# Patient Record
Sex: MALE | Race: OTHER | HISPANIC OR LATINO | Employment: OTHER | URBAN - METROPOLITAN AREA
[De-identification: names, ages, dates, MRNs, and addresses within clinical notes are randomized per-mention and may not be internally consistent; named-entity substitution may affect disease eponyms.]

---

## 2018-09-11 ENCOUNTER — APPOINTMENT (OUTPATIENT)
Dept: RADIOLOGY | Facility: MEDICAL CENTER | Age: 83
End: 2018-09-11
Attending: EMERGENCY MEDICINE
Payer: MEDICARE

## 2018-09-11 ENCOUNTER — HOSPITAL ENCOUNTER (EMERGENCY)
Facility: MEDICAL CENTER | Age: 83
End: 2018-09-11
Attending: EMERGENCY MEDICINE
Payer: MEDICARE

## 2018-09-11 VITALS
HEIGHT: 60 IN | WEIGHT: 165.12 LBS | DIASTOLIC BLOOD PRESSURE: 88 MMHG | OXYGEN SATURATION: 96 % | RESPIRATION RATE: 14 BRPM | SYSTOLIC BLOOD PRESSURE: 152 MMHG | HEART RATE: 65 BPM | BODY MASS INDEX: 32.42 KG/M2 | TEMPERATURE: 98.7 F

## 2018-09-11 DIAGNOSIS — M54.31 RIGHT SIDED SCIATICA: ICD-10-CM

## 2018-09-11 PROCEDURE — 73502 X-RAY EXAM HIP UNI 2-3 VIEWS: CPT | Mod: RT

## 2018-09-11 PROCEDURE — 700102 HCHG RX REV CODE 250 W/ 637 OVERRIDE(OP): Performed by: EMERGENCY MEDICINE

## 2018-09-11 PROCEDURE — 99284 EMERGENCY DEPT VISIT MOD MDM: CPT

## 2018-09-11 PROCEDURE — A9270 NON-COVERED ITEM OR SERVICE: HCPCS | Performed by: EMERGENCY MEDICINE

## 2018-09-11 RX ORDER — OXYCODONE HYDROCHLORIDE AND ACETAMINOPHEN 5; 325 MG/1; MG/1
1-2 TABLET ORAL EVERY 4 HOURS PRN
Qty: 12 TAB | Refills: 0 | Status: SHIPPED | OUTPATIENT
Start: 2018-09-11 | End: 2018-09-14

## 2018-09-11 RX ORDER — OXYCODONE HYDROCHLORIDE AND ACETAMINOPHEN 5; 325 MG/1; MG/1
1 TABLET ORAL ONCE
Status: COMPLETED | OUTPATIENT
Start: 2018-09-11 | End: 2018-09-11

## 2018-09-11 RX ADMIN — OXYCODONE HYDROCHLORIDE AND ACETAMINOPHEN 1 TABLET: 5; 325 TABLET ORAL at 19:52

## 2018-09-11 ASSESSMENT — PAIN SCALES - GENERAL
PAINLEVEL_OUTOF10: 10
PAINLEVEL_OUTOF10: 10

## 2018-09-12 NOTE — DISCHARGE INSTRUCTIONS
Ciática  (Sciatica)  La ciática es el dolor, entumecimiento, debilidad u hormigueo a lo carol del nervio ciático. El nervio ciático comienza en la parte inferior de la espalda y desciende por la parte posterior de cada pierna. Controla los músculos en la parte inferior de las piernas y en la parte posterior de las rodillas. También otorga sensibilidad a la parte posterior de los muslos, la parte inferior de las piernas y la planta de los pies. La ciática es un síntoma de otra afección que ejerce presión o “pellizca” el nervio ciático.  Generalmente la ciática afecta sólo un lado del cuerpo. Suele desaparecer por sí tenisha o con tratamiento. En algunos casos, la ciática puede volver a aparecer .  CAUSAS  Esta afección causa presión sobre el nervio ciático o lo “pellizca”. [place] puede ser el resultado de:  · Un disco que sobresale demasiado (hernia de disco) entre los huesos de la columna vertebral (vértebras).  · Cambios relacionados con la edad en los discos de la columna vertebral (discopatía degenerativa).  · Un trastorno doloroso que afecta un músculo de los glúteos (síndrome piriforme).  · Un crecimiento óseo adicional (espolón óseo) cerca del nervio ciático.  · Leslie lesión o fractura de la pelvis.  · Embarazo.  · Tumor (poco frecuente).  FACTORES DE RIESGO  Los siguientes factores pueden hacer que usted sea propenso a sufrir esta afección:  · Practicar deportes en los que se ejerce presión sobre la columna vertebral o en los que la columna realiza mucho esfuerzo, liliam el fútbol americano o el levantamiento de pesas.  · Tener poca fuerza y flexibilidad.  · Antecedentes médicos de lesiones en la espalda.  · Antecedentes médicos de cirugía en la espalda.  · Estar sentado diann largos períodos.  · Realizar actividades que requieren agacharse o levantar objetos en forma repetida.  · Obesidad.  SÍNTOMAS  Los síntomas pueden ser leves o graves, y pueden incluir los siguientes:  · Cualquiera de los siguientes problemas  en la parte inferior de la espalda, piernas, cadera o glúteos:  ¨ Hormigueo leve o dolor sordo.  ¨ Sensación de ardor.  ¨ Dolor aric.  · Adormecimiento de la parte posterior de la pantorrilla o la planta del pie.  · Debilidad en las piernas.  · Dolor de espalda intenso que dificulta el movimiento.  Estos síntomas podrían empeorar al toser, estornudar o reírse, o cuando se está sentado o de pie diann períodos prolongados. El sobrepeso también puede empeorar los síntomas. En algunos casos, los síntomas regresan luego de un tiempo.  DIAGNÓSTICO  Esta afección se puede diagnosticar en función de lo siguiente:  · Jayda síntomas.  · Un examen físico. El médico podría indicarle que realice ciertos movimientos para controlar si estos desencadenan los síntomas.  · También pueden hacerle exámenes que incluyen lo siguiente:  ¨ Análisis de yakov.  ¨ Radiografías.  ¨ Resonancia magnética (RM).  ¨ Tomografía computarizada (TC).  TRATAMIENTO  En muchos casos, esta afección mejora por sí tenisha, sin ningún tratamiento. Sin embargo, el tratamiento puede incluir lo siguiente:  · Reducción o modificación de la actividad física en los períodos de dolor.  · Ejercicios y estiramiento para fortalecer el abdomen y mejorar la flexibilidad de la columna vertebral.  · Aplicación de calor o hielo en la dom afectada.  · Medicamentos para lo siguiente:  ¨ Aliviar el dolor y la inflamación.  ¨ Relajar los músculos.  · Medicamentos inyectables que ayudan a aliviar el dolor, la irritación y la inflamación alrededor del nervio ciático (esteroides).  · Cirugía.  INSTRUCCIONES PARA EL CUIDADO EN EL HOGAR  Medicamentos   · [place]antis los medicamentos de venta manish y los recetados solamente liliam se lo haya indicado el médico.  · No conduzca ni opere maquinaria pesada mientras lakeshia analgésicos recetados.  Control del dolor   · Si se lo indican, aplique hielo en la dom afectada.  ¨ Ponga el hielo en jeanmarie bolsa plástica.  ¨ Coloque jeanmarie toalla entre la piel y  la bolsa de hielo.  ¨ Coloque el hielo diann 20 minutos, 2 a 3 veces por día.  · Después del hielo, aplique calor sobre la dom afectada antes de realizar ejercicio o con la frecuencia que le haya indicado el médico. Use la milagros de calor que el médico le recomiende, liliam jeanmarie compresa de calor húmedo o jeanmarie almohadilla térmica.  ¨ Coloque jeanmarie toalla entre la piel y la milagros de calor.  ¨ Aplique el calor diann 20 a 30 minutos.  ¨ Retire la milagros de calor si la piel se le pone de color melo brillante. [place] es muy importante si no puede sentir el dolor, el calor o el frío. Puede correr un riesgo mayor de sufrir quemaduras.  Actividad   · Reanude vee actividades normales liliam se lo haya indicado el médico. Pregúntele al médico qué actividades son seguras para usted.  ¨ Evite las actividades que empeoran los síntomas.  · Diann el día, descanse diann lapsos breves. Descansar recostado o de pie suele ser mejor que hacerlo sentado.  ¨ Cuando descanse diann períodos más largos, incorpore alguna actividad suave o ejercicios de elongación entre períodos. [place] ayudará a evitar la rigidez y el dolor.  ¨ Evite estar sentado diann largos períodos sin moverse. Levántese y muévase al menos jeanmarie vez cada hora.  · Zuleika ejercicio y elongue habitualmente, liliam se lo haya indicado el médico.  · No levante nada que pese más de 10 libras (4,5 kg) mientras tenga síntomas de ciática. Aunque no tenga síntomas, evite levantar objetos pesados, en especial en forma repetida.  · Siempre use las técnicas de levantamiento correctas para levantar objetos, entre ellas:  ¨ Flexionar las rodillas.  ¨ Mantener la carga cerca del cuerpo.  ¨ No torcerse.  Instrucciones generales   · Mantenga jeanmarie buena postura.  ¨ Evite reclinarse hacia adelante cuando está sentado.  ¨ Evite encorvar la espalda mientras está de pie.  · Mantenga un peso saludable. El exceso de peso ejerce presión adicional sobre la espalda y hace que resulte difícil mantener  jeanmarie buena postura.  · Use calzado con buen apoyo y cómodo. Evite usar tacones.  · Evite dormir sobre un colchón que sea demasiado blando o demasiado sheldon. Un colchón que ofrezca un apoyo suficientemente firme para mariscal espalda al dormir puede ayudar a aliviar el dolor.  · Concurra a todas las visitas de control liliam se lo haya indicado el médico. [place] es importante.  SOLICITE ATENCIÓN MÉDICA SI:  · El dolor lo despierta cuando está dormido.  · El dolor empeora cuando se acuesta.  · El dolor es peor del que experimentó en el pasado.  · Los síntomas castro más de 4 semanas.  · Pierde peso en forma inexplicable.  SOLICITE ATENCIÓN MÉDICA DE INMEDIATO SI:  · Pierde el control de la vejiga o del intestino (incontinencia).  · Tiene los siguientes síntomas:  ¨ Debilidad que empeora en la parte inferior de la espalda, la pelvis, los glúteos o las piernas.  ¨ Enrojecimiento o inflamación en la espalda.  ¨ Sensación de ardor al orinar.  Esta información no tiene liliam fin reemplazar el consejo del médico. Asegúrese de hacerle al médico cualquier pregunta que tenga.  Document Released: 12/18/2006 Document Revised: 04/10/2017 Document Reviewed: 08/26/2016  Elsevier Interactive Patient Education © 2017 [employer]

## 2018-09-12 NOTE — ED NOTES
Discharge instructions provided to pt and family who provide translation. Pt verbalized understanding of discharge and follow up instructions.  Pt given Rx.  VSS.  All questions answered, controlled substance consent form explained, signed. Pt was taken by wheelchair with family to discharge.

## 2018-09-12 NOTE — ED TRIAGE NOTES
.Ean Ballesteros  .  Chief Complaint   Patient presents with   • Hip Pain     patient c/o right hip pain radiating down leg.      Patient ambulatory to triage with cane and with above complaint. Denies any trauma to area. ./80   Pulse 69   Temp 37.1 °C (98.7 °F)   Resp 16   Ht 1.524 m (5')   Wt 74.9 kg (165 lb 2 oz)   SpO2 94%   BMI 32.25 kg/m²     Patient to senior lounge with and instructed to inform staff of any needs.

## 2020-11-19 ENCOUNTER — HOSPITAL ENCOUNTER (OUTPATIENT)
Dept: LAB | Facility: MEDICAL CENTER | Age: 85
End: 2020-11-19
Attending: FAMILY MEDICINE
Payer: MEDICARE

## 2020-11-19 LAB
ANION GAP SERPL CALC-SCNC: 6 MMOL/L (ref 7–16)
BUN SERPL-MCNC: 13 MG/DL (ref 8–22)
CALCIUM SERPL-MCNC: 9 MG/DL (ref 8.5–10.5)
CHLORIDE SERPL-SCNC: 108 MMOL/L (ref 96–112)
CO2 SERPL-SCNC: 28 MMOL/L (ref 20–33)
CREAT SERPL-MCNC: 0.77 MG/DL (ref 0.5–1.4)
GLUCOSE SERPL-MCNC: 104 MG/DL (ref 65–99)
POTASSIUM SERPL-SCNC: 3.6 MMOL/L (ref 3.6–5.5)
SODIUM SERPL-SCNC: 142 MMOL/L (ref 135–145)

## 2020-11-19 PROCEDURE — 80048 BASIC METABOLIC PNL TOTAL CA: CPT

## 2020-11-19 PROCEDURE — 36415 COLL VENOUS BLD VENIPUNCTURE: CPT

## 2021-01-14 DIAGNOSIS — Z23 NEED FOR VACCINATION: ICD-10-CM

## 2021-07-24 ENCOUNTER — OFFICE VISIT (OUTPATIENT)
Dept: URGENT CARE | Facility: PHYSICIAN GROUP | Age: 86
End: 2021-07-24
Payer: MEDICARE

## 2021-07-24 VITALS
DIASTOLIC BLOOD PRESSURE: 110 MMHG | HEART RATE: 131 BPM | RESPIRATION RATE: 16 BRPM | SYSTOLIC BLOOD PRESSURE: 162 MMHG | TEMPERATURE: 97.8 F | OXYGEN SATURATION: 94 %

## 2021-07-24 DIAGNOSIS — R33.8 ACUTE URINARY RETENTION: ICD-10-CM

## 2021-07-24 PROCEDURE — 99203 OFFICE O/P NEW LOW 30 MIN: CPT | Performed by: NURSE PRACTITIONER

## 2021-07-24 RX ORDER — TAMSULOSIN HYDROCHLORIDE 0.4 MG/1
0.4 CAPSULE ORAL
Status: ON HOLD | COMMUNITY
End: 2022-07-19

## 2021-07-24 RX ORDER — MELOXICAM 15 MG/1
15 TABLET ORAL DAILY
COMMUNITY

## 2021-07-24 NOTE — PROGRESS NOTES
Chief Complaint   Patient presents with   • UTI     x pain and pressure        HISTORY OF PRESENT ILLNESS: Patient is a pleasant 88 y.o. male with a history of BPH who presents to urgent care today with concerns of urinary retention.  States he has had urinary retention since yesterday, with only a few episodes of being able to urinate.  When he has been able to urinate he has experienced pain.  He denies any fever, chills, hematuria, testicular pain or swelling.  He does have some suprapubic pain.  He is here today with his daughter, both provide the history.          There are no problems to display for this patient.      Allergies:Patient has no known allergies.    Current Outpatient Medications Ordered in Epic   Medication Sig Dispense Refill   • meloxicam (MOBIC) 15 MG tablet Take 15 mg by mouth every day.     • tamsulosin (FLOMAX) 0.4 MG capsule Take 0.4 mg by mouth 1/2 hour after breakfast.     • simvastatin (ZOCOR) 20 MG TABS TAKE 1 TABLET BY MOUTH EVERY EVENING 30 Each 3   • finasteride (PROSCAR) 5 MG TABS TAKE 1 TABLET BY MOUTH EVERY DAY 30 Each 3   • doxazosin (CARDURA) 2 MG TABS TAKE 1 TABLET BY MOUTH EVERY NIGHT AT BEDTIME 90 Each 1   • Diclofenac Sodium (VOLTAREN) 1 % GEL Apply  to skin as directed.     • ibuprofen (MOTRIN) 800 MG TABS Take 1 Tab by mouth every 8 hours as needed for Mild Pain. One tid pc prn pain. 90 Each 1   • Diclofenac Sodium 1 % GEL Apply  to skin as directed. Apply to bid to ankles. 100 g 5   • hydrocodone-acetaminophen (NORCO) 7.5-325 MG per tablet Take 1 Tab by mouth every 8 hours as needed for Mild Pain. 30 Tab 0   • dutaseride (AVODART) 0.5 MG capsule Take 1 Cap by mouth every day. 30 Cap 6   • alfuzosin (UROXATRAL) 10 MG SR tablet Take 1 Tab by mouth. Leslie vez al carie despues de la hardik. 30 Tab 6     No current Epic-ordered facility-administered medications on file.       Past Medical History:   Diagnosis Date   • High cholesterol    • Hypertension    • Migraine        Social  History     Tobacco Use   • Smoking status: Never Smoker   • Smokeless tobacco: Never Used   Substance Use Topics   • Alcohol use: No     Comment: once in a while   • Drug use: No       No family status information on file.   No family history on file.    ROS:  Review of Systems   Constitutional: Negative for fever, chills, weight loss, malaise, and fatigue.   HENT: Negative for ear pain, nosebleeds, congestion, sore throat and neck pain.    Eyes: Negative for vision changes.   Neuro: Negative for headache, sensory changes, weakness, seizure, LOC.   Cardiovascular: Negative for chest pain, palpitations, orthopnea and leg swelling.   Respiratory: Negati thank you ve for cough, sputum production, shortness of breath and wheezing.   Gastrointestinal: Negative for abdominal pain, nausea, vomiting or diarrhea.   Genitourinary: Positive for dysuria, retention, suprapubic pain.  Negative for hematuria, urgency and frequency.  Musculoskeletal: Negative for falls, neck pain, back pain, joint pain, myalgias.   Skin: Negative for rash, diaphoresis.     Exam:  BP (!) 162/110   Pulse (!) 131   Temp 36.6 °C (97.8 °F) (Temporal)   Resp 16   SpO2 94%   General: well-nourished, well-developed male in NAD  Head: normocephalic, atraumatic  Eyes: PERRLA, no conjunctival injection, acuity grossly intact, lids normal.  Ears: normal shape and symmetry, no tenderness, no discharge. External canals are without any significant edema or erythema. Tympanic membranes are without any inflammation, no effusion. Gross auditory acuity is intact.  Nose: symmetrical without tenderness, no discharge.  Mouth/Throat: reasonable hygiene, no erythema, exudates or tonsillar enlargement.  Neck: no masses, range of motion within normal limits, no tracheal deviation. No obvious thyroid enlargement.   Lymph: no cervical adenopathy. No supraclavicular adenopathy.   Neuro: alert and oriented. Cranial nerves 1-12 grossly intact. No sensory deficit.    Cardiovascular: Tachycardic rate and regular rhythm. No edema.  Pulmonary: no distress. Chest is symmetrical with respiration, no wheezes, crackles, or rhonchi.   Abdomen: soft, suprapubic pressure, no guarding, no hepatosplenomegaly.  No CVA tenderness.  Musculoskeletal: no clubbing, appropriate muscle tone, gait is stable.  Skin: warm, dry, intact, no clubbing, no cyanosis, no rashes.   Psych: appropriate mood, affect, judgement.       POC urine: Patient is unable to give sample      Assessment/Plan:  1. Acute urinary retention  POCT Urinalysis       Patient is a pleasant 88-year-old male with history of BPH who has had urinary retention and dysuria since yesterday.  He is unable to give a urine sample and is found to be tachycardic in clinic. At this time, I feel the patient requires a higher level of care in the ED for closer monitoring, stat lab work and/or imaging for further evaluation. This has been discussed with the patient and he and his daughter state agreement and understanding. The patient's daughter will take him directly to the emergency department of their choice, without delay, directly from clinic.        Please note that this dictation was created using voice recognition software. I have made every reasonable attempt to correct obvious errors, but I expect that there are errors of grammar and possibly content that I did not discover before finalizing the note.      MAGDA Ronquillo.

## 2021-09-14 ENCOUNTER — HOSPITAL ENCOUNTER (OUTPATIENT)
Dept: LAB | Facility: MEDICAL CENTER | Age: 86
End: 2021-09-14
Attending: FAMILY MEDICINE
Payer: MEDICARE

## 2021-09-14 LAB
ANION GAP SERPL CALC-SCNC: 10 MMOL/L (ref 7–16)
BUN SERPL-MCNC: 14 MG/DL (ref 8–22)
CALCIUM SERPL-MCNC: 8.8 MG/DL (ref 8.5–10.5)
CHLORIDE SERPL-SCNC: 106 MMOL/L (ref 96–112)
CO2 SERPL-SCNC: 24 MMOL/L (ref 20–33)
CREAT SERPL-MCNC: 0.81 MG/DL (ref 0.5–1.4)
GLUCOSE SERPL-MCNC: 88 MG/DL (ref 65–99)
POTASSIUM SERPL-SCNC: 4.3 MMOL/L (ref 3.6–5.5)
SODIUM SERPL-SCNC: 140 MMOL/L (ref 135–145)

## 2021-09-14 PROCEDURE — 36415 COLL VENOUS BLD VENIPUNCTURE: CPT

## 2021-09-14 PROCEDURE — 80048 BASIC METABOLIC PNL TOTAL CA: CPT

## 2022-03-24 ENCOUNTER — HOSPITAL ENCOUNTER (OUTPATIENT)
Dept: LAB | Facility: MEDICAL CENTER | Age: 87
End: 2022-03-24
Attending: FAMILY MEDICINE
Payer: MEDICARE

## 2022-03-24 LAB
ALBUMIN SERPL BCP-MCNC: 4.3 G/DL (ref 3.2–4.9)
ALBUMIN/GLOB SERPL: 1.3 G/DL
ALP SERPL-CCNC: 92 U/L (ref 30–99)
ALT SERPL-CCNC: 26 U/L (ref 2–50)
ANION GAP SERPL CALC-SCNC: 13 MMOL/L (ref 7–16)
AST SERPL-CCNC: 29 U/L (ref 12–45)
BASOPHILS # BLD AUTO: 0.9 % (ref 0–1.8)
BASOPHILS # BLD: 0.06 K/UL (ref 0–0.12)
BILIRUB SERPL-MCNC: 0.7 MG/DL (ref 0.1–1.5)
BUN SERPL-MCNC: 15 MG/DL (ref 8–22)
CALCIUM SERPL-MCNC: 8.9 MG/DL (ref 8.5–10.5)
CHLORIDE SERPL-SCNC: 103 MMOL/L (ref 96–112)
CO2 SERPL-SCNC: 23 MMOL/L (ref 20–33)
CREAT SERPL-MCNC: 1.03 MG/DL (ref 0.5–1.4)
EOSINOPHIL # BLD AUTO: 0.24 K/UL (ref 0–0.51)
EOSINOPHIL NFR BLD: 3.5 % (ref 0–6.9)
ERYTHROCYTE [DISTWIDTH] IN BLOOD BY AUTOMATED COUNT: 53.7 FL (ref 35.9–50)
GFR SERPLBLD CREATININE-BSD FMLA CKD-EPI: 69 ML/MIN/1.73 M 2
GLOBULIN SER CALC-MCNC: 3.2 G/DL (ref 1.9–3.5)
GLUCOSE SERPL-MCNC: 96 MG/DL (ref 65–99)
HCT VFR BLD AUTO: 40.8 % (ref 42–52)
HGB BLD-MCNC: 12.7 G/DL (ref 14–18)
IMM GRANULOCYTES # BLD AUTO: 0.02 K/UL (ref 0–0.11)
IMM GRANULOCYTES NFR BLD AUTO: 0.3 % (ref 0–0.9)
LYMPHOCYTES # BLD AUTO: 2.95 K/UL (ref 1–4.8)
LYMPHOCYTES NFR BLD: 43.5 % (ref 22–41)
MCH RBC QN AUTO: 29.5 PG (ref 27–33)
MCHC RBC AUTO-ENTMCNC: 31.1 G/DL (ref 33.7–35.3)
MCV RBC AUTO: 94.9 FL (ref 81.4–97.8)
MONOCYTES # BLD AUTO: 0.62 K/UL (ref 0–0.85)
MONOCYTES NFR BLD AUTO: 9.1 % (ref 0–13.4)
NEUTROPHILS # BLD AUTO: 2.89 K/UL (ref 1.82–7.42)
NEUTROPHILS NFR BLD: 42.7 % (ref 44–72)
NRBC # BLD AUTO: 0 K/UL
NRBC BLD-RTO: 0 /100 WBC
PLATELET # BLD AUTO: 328 K/UL (ref 164–446)
PMV BLD AUTO: 10.1 FL (ref 9–12.9)
POTASSIUM SERPL-SCNC: 4.5 MMOL/L (ref 3.6–5.5)
PROT SERPL-MCNC: 7.5 G/DL (ref 6–8.2)
RBC # BLD AUTO: 4.3 M/UL (ref 4.7–6.1)
SODIUM SERPL-SCNC: 139 MMOL/L (ref 135–145)
WBC # BLD AUTO: 6.8 K/UL (ref 4.8–10.8)

## 2022-03-24 PROCEDURE — 80053 COMPREHEN METABOLIC PANEL: CPT

## 2022-03-24 PROCEDURE — 85025 COMPLETE CBC W/AUTO DIFF WBC: CPT

## 2022-03-24 PROCEDURE — 36415 COLL VENOUS BLD VENIPUNCTURE: CPT

## 2022-06-29 ENCOUNTER — PRE-ADMISSION TESTING (OUTPATIENT)
Dept: ADMISSIONS | Facility: MEDICAL CENTER | Age: 87
End: 2022-06-29
Attending: UROLOGY
Payer: MEDICARE

## 2022-06-29 DIAGNOSIS — Z01.810 PRE-OPERATIVE CARDIOVASCULAR EXAMINATION: ICD-10-CM

## 2022-06-29 DIAGNOSIS — Z01.812 PRE-OPERATIVE LABORATORY EXAMINATION: ICD-10-CM

## 2022-06-29 LAB
ANION GAP SERPL CALC-SCNC: 13 MMOL/L (ref 7–16)
BUN SERPL-MCNC: 19 MG/DL (ref 8–22)
CALCIUM SERPL-MCNC: 8.8 MG/DL (ref 8.4–10.2)
CHLORIDE SERPL-SCNC: 101 MMOL/L (ref 96–112)
CO2 SERPL-SCNC: 25 MMOL/L (ref 20–33)
CREAT SERPL-MCNC: 1.05 MG/DL (ref 0.5–1.4)
ERYTHROCYTE [DISTWIDTH] IN BLOOD BY AUTOMATED COUNT: 48.5 FL (ref 35.9–50)
GFR SERPLBLD CREATININE-BSD FMLA CKD-EPI: 68 ML/MIN/1.73 M 2
GLUCOSE SERPL-MCNC: 101 MG/DL (ref 65–99)
HCT VFR BLD AUTO: 47.6 % (ref 42–52)
HGB BLD-MCNC: 15.9 G/DL (ref 14–18)
INR PPP: 1.02 (ref 0.87–1.13)
MCH RBC QN AUTO: 30.9 PG (ref 27–33)
MCHC RBC AUTO-ENTMCNC: 33.4 G/DL (ref 33.7–35.3)
MCV RBC AUTO: 92.4 FL (ref 81.4–97.8)
PLATELET # BLD AUTO: 238 K/UL (ref 164–446)
PMV BLD AUTO: 9.2 FL (ref 9–12.9)
POTASSIUM SERPL-SCNC: 3.5 MMOL/L (ref 3.6–5.5)
PROTHROMBIN TIME: 12.6 SEC (ref 12–14.6)
RBC # BLD AUTO: 5.15 M/UL (ref 4.7–6.1)
SODIUM SERPL-SCNC: 139 MMOL/L (ref 135–145)
WBC # BLD AUTO: 10.6 K/UL (ref 4.8–10.8)

## 2022-06-29 PROCEDURE — 80048 BASIC METABOLIC PNL TOTAL CA: CPT

## 2022-06-29 PROCEDURE — 36415 COLL VENOUS BLD VENIPUNCTURE: CPT

## 2022-06-29 PROCEDURE — 85027 COMPLETE CBC AUTOMATED: CPT

## 2022-06-29 PROCEDURE — 93005 ELECTROCARDIOGRAM TRACING: CPT

## 2022-06-29 PROCEDURE — 85610 PROTHROMBIN TIME: CPT

## 2022-06-29 RX ORDER — FUROSEMIDE 40 MG/1
TABLET ORAL
COMMUNITY

## 2022-06-29 RX ORDER — CIPROFLOXACIN 500 MG/1
TABLET, FILM COATED ORAL
COMMUNITY
End: 2022-06-29

## 2022-06-29 RX ORDER — OXYBUTYNIN CHLORIDE 5 MG/1
TABLET, EXTENDED RELEASE ORAL
COMMUNITY
End: 2022-06-29

## 2022-06-29 ASSESSMENT — FIBROSIS 4 INDEX: FIB4 SCORE: 1.54

## 2022-06-29 NOTE — OR NURSING
"Preadmit appointment: \" Preparing for your Procedure information\" sheet given to patient with verbal and written instructions. Patient instructed to continue prescribed medications through the day before surgery, instructed to take the following medications the day of surgery per anesthesia protocol: FLOMAX, pt states he will take the Flomax the DOS.    Pt states he ran out of his HTN med a month ago and does not remember the name of it; it is at the [facility] pharm and his other daughter will pick it up for him. I encouraged and instructed him the importance of taking his HTN meds daily and when he is getting close to running out of this med to get more before he doesn't have any more.      Unable to collect a UA w/Culture because he has a urine cath that I can't access for a culture. I called Dr. Garcia's office and spoke with Patricia informing her of this and she instructed the pt to come to the office and they will get the urine sample for the test. I instructed the pt and his daughter to got to Dr. Garcia's office and the daughter stated she will go there after they leave pre-admit.     "

## 2022-06-30 LAB — EKG IMPRESSION: NORMAL

## 2022-06-30 PROCEDURE — 93010 ELECTROCARDIOGRAM REPORT: CPT | Performed by: INTERNAL MEDICINE

## 2022-07-19 ENCOUNTER — HOSPITAL ENCOUNTER (OUTPATIENT)
Facility: MEDICAL CENTER | Age: 87
End: 2022-07-20
Attending: UROLOGY | Admitting: UROLOGY
Payer: MEDICARE

## 2022-07-19 ENCOUNTER — ANESTHESIA (OUTPATIENT)
Dept: SURGERY | Facility: MEDICAL CENTER | Age: 87
End: 2022-07-19
Payer: MEDICARE

## 2022-07-19 ENCOUNTER — ANESTHESIA EVENT (OUTPATIENT)
Dept: SURGERY | Facility: MEDICAL CENTER | Age: 87
End: 2022-07-19
Payer: MEDICARE

## 2022-07-19 DIAGNOSIS — R33.8 URINARY RETENTION DUE TO BENIGN PROSTATIC HYPERPLASIA: Primary | ICD-10-CM

## 2022-07-19 DIAGNOSIS — N40.1 URINARY RETENTION DUE TO BENIGN PROSTATIC HYPERPLASIA: Primary | ICD-10-CM

## 2022-07-19 LAB — PATHOLOGY CONSULT NOTE: NORMAL

## 2022-07-19 PROCEDURE — 96374 THER/PROPH/DIAG INJ IV PUSH: CPT

## 2022-07-19 PROCEDURE — 88307 TISSUE EXAM BY PATHOLOGIST: CPT

## 2022-07-19 PROCEDURE — 99100 ANES PT EXTEME AGE<1 YR&>70: CPT | Performed by: ANESTHESIOLOGY

## 2022-07-19 PROCEDURE — G0378 HOSPITAL OBSERVATION PER HR: HCPCS

## 2022-07-19 PROCEDURE — 700111 HCHG RX REV CODE 636 W/ 250 OVERRIDE (IP): Performed by: UROLOGY

## 2022-07-19 PROCEDURE — 700102 HCHG RX REV CODE 250 W/ 637 OVERRIDE(OP): Performed by: UROLOGY

## 2022-07-19 PROCEDURE — 700111 HCHG RX REV CODE 636 W/ 250 OVERRIDE (IP)

## 2022-07-19 PROCEDURE — 700111 HCHG RX REV CODE 636 W/ 250 OVERRIDE (IP): Performed by: ANESTHESIOLOGY

## 2022-07-19 PROCEDURE — 160002 HCHG RECOVERY MINUTES (STAT): Performed by: UROLOGY

## 2022-07-19 PROCEDURE — 160035 HCHG PACU - 1ST 60 MINS PHASE I: Performed by: UROLOGY

## 2022-07-19 PROCEDURE — 700101 HCHG RX REV CODE 250: Performed by: UROLOGY

## 2022-07-19 PROCEDURE — 160031 HCHG SURGERY MINUTES - 1ST 30 MINS LEVEL 5: Performed by: UROLOGY

## 2022-07-19 PROCEDURE — 502714 HCHG ROBOTIC SURGERY SERVICES: Performed by: UROLOGY

## 2022-07-19 PROCEDURE — 700101 HCHG RX REV CODE 250: Performed by: ANESTHESIOLOGY

## 2022-07-19 PROCEDURE — 160042 HCHG SURGERY MINUTES - EA ADDL 1 MIN LEVEL 5: Performed by: UROLOGY

## 2022-07-19 PROCEDURE — 700105 HCHG RX REV CODE 258: Performed by: ANESTHESIOLOGY

## 2022-07-19 PROCEDURE — 700102 HCHG RX REV CODE 250 W/ 637 OVERRIDE(OP): Performed by: ANESTHESIOLOGY

## 2022-07-19 PROCEDURE — 700105 HCHG RX REV CODE 258: Performed by: UROLOGY

## 2022-07-19 PROCEDURE — 160009 HCHG ANES TIME/MIN: Performed by: UROLOGY

## 2022-07-19 PROCEDURE — 00908 ANES PERINEAL PROSTATECTOMY: CPT | Performed by: ANESTHESIOLOGY

## 2022-07-19 PROCEDURE — A9270 NON-COVERED ITEM OR SERVICE: HCPCS | Performed by: UROLOGY

## 2022-07-19 PROCEDURE — 160048 HCHG OR STATISTICAL LEVEL 1-5: Performed by: UROLOGY

## 2022-07-19 PROCEDURE — A9270 NON-COVERED ITEM OR SERVICE: HCPCS | Performed by: ANESTHESIOLOGY

## 2022-07-19 RX ORDER — HYDROMORPHONE HYDROCHLORIDE 1 MG/ML
0.2 INJECTION, SOLUTION INTRAMUSCULAR; INTRAVENOUS; SUBCUTANEOUS
Status: DISCONTINUED | OUTPATIENT
Start: 2022-07-19 | End: 2022-07-19 | Stop reason: HOSPADM

## 2022-07-19 RX ORDER — FUROSEMIDE 40 MG/1
40 TABLET ORAL
Status: DISCONTINUED | OUTPATIENT
Start: 2022-07-20 | End: 2022-07-20 | Stop reason: HOSPADM

## 2022-07-19 RX ORDER — ONDANSETRON 2 MG/ML
4 INJECTION INTRAMUSCULAR; INTRAVENOUS
Status: COMPLETED | OUTPATIENT
Start: 2022-07-19 | End: 2022-07-19

## 2022-07-19 RX ORDER — MIDAZOLAM HYDROCHLORIDE 1 MG/ML
INJECTION INTRAMUSCULAR; INTRAVENOUS PRN
Status: DISCONTINUED | OUTPATIENT
Start: 2022-07-19 | End: 2022-07-19 | Stop reason: SURG

## 2022-07-19 RX ORDER — SODIUM CHLORIDE, SODIUM LACTATE, POTASSIUM CHLORIDE, CALCIUM CHLORIDE 600; 310; 30; 20 MG/100ML; MG/100ML; MG/100ML; MG/100ML
INJECTION, SOLUTION INTRAVENOUS CONTINUOUS
Status: ACTIVE | OUTPATIENT
Start: 2022-07-19 | End: 2022-07-19

## 2022-07-19 RX ORDER — ACETAMINOPHEN 500 MG
1000 TABLET ORAL EVERY 6 HOURS PRN
Status: DISCONTINUED | OUTPATIENT
Start: 2022-07-24 | End: 2022-07-20 | Stop reason: HOSPADM

## 2022-07-19 RX ORDER — ACETAMINOPHEN 500 MG
1000 TABLET ORAL EVERY 6 HOURS
Status: DISCONTINUED | OUTPATIENT
Start: 2022-07-19 | End: 2022-07-20 | Stop reason: HOSPADM

## 2022-07-19 RX ORDER — ACETAMINOPHEN 500 MG
1000 TABLET ORAL ONCE
Status: COMPLETED | OUTPATIENT
Start: 2022-07-19 | End: 2022-07-19

## 2022-07-19 RX ORDER — ROCURONIUM BROMIDE 10 MG/ML
INJECTION, SOLUTION INTRAVENOUS PRN
Status: DISCONTINUED | OUTPATIENT
Start: 2022-07-19 | End: 2022-07-19 | Stop reason: SURG

## 2022-07-19 RX ORDER — ATROPA BELLADONNA AND OPIUM 16.2; 6 MG/1; MG/1
SUPPOSITORY RECTAL
Status: DISCONTINUED | OUTPATIENT
Start: 2022-07-19 | End: 2022-07-19 | Stop reason: HOSPADM

## 2022-07-19 RX ORDER — DOXYCYCLINE 100 MG/1
100 TABLET ORAL EVERY 12 HOURS
Status: DISCONTINUED | OUTPATIENT
Start: 2022-07-19 | End: 2022-07-20 | Stop reason: HOSPADM

## 2022-07-19 RX ORDER — HYDROMORPHONE HYDROCHLORIDE 1 MG/ML
0.4 INJECTION, SOLUTION INTRAMUSCULAR; INTRAVENOUS; SUBCUTANEOUS
Status: DISCONTINUED | OUTPATIENT
Start: 2022-07-19 | End: 2022-07-19 | Stop reason: HOSPADM

## 2022-07-19 RX ORDER — HYDROMORPHONE HYDROCHLORIDE 1 MG/ML
0.25 INJECTION, SOLUTION INTRAMUSCULAR; INTRAVENOUS; SUBCUTANEOUS
Status: DISCONTINUED | OUTPATIENT
Start: 2022-07-19 | End: 2022-07-20 | Stop reason: HOSPADM

## 2022-07-19 RX ORDER — HYDROMORPHONE HYDROCHLORIDE 1 MG/ML
0.1 INJECTION, SOLUTION INTRAMUSCULAR; INTRAVENOUS; SUBCUTANEOUS
Status: DISCONTINUED | OUTPATIENT
Start: 2022-07-19 | End: 2022-07-19 | Stop reason: HOSPADM

## 2022-07-19 RX ORDER — OXYCODONE HCL 5 MG/5 ML
10 SOLUTION, ORAL ORAL
Status: DISCONTINUED | OUTPATIENT
Start: 2022-07-19 | End: 2022-07-19 | Stop reason: HOSPADM

## 2022-07-19 RX ORDER — IBUPROFEN 400 MG/1
800 TABLET ORAL 3 TIMES DAILY PRN
Status: DISCONTINUED | OUTPATIENT
Start: 2022-07-22 | End: 2022-07-20 | Stop reason: HOSPADM

## 2022-07-19 RX ORDER — DOXYCYCLINE HYCLATE 100 MG
100 TABLET ORAL 2 TIMES DAILY
Status: ON HOLD | COMMUNITY
End: 2022-07-19 | Stop reason: SDUPTHER

## 2022-07-19 RX ORDER — CIPROFLOXACIN 2 MG/ML
400 INJECTION, SOLUTION INTRAVENOUS
Status: DISCONTINUED | OUTPATIENT
Start: 2022-07-19 | End: 2022-07-19 | Stop reason: HOSPADM

## 2022-07-19 RX ORDER — OXYCODONE HYDROCHLORIDE 5 MG/1
2.5 TABLET ORAL
Status: DISCONTINUED | OUTPATIENT
Start: 2022-07-19 | End: 2022-07-20 | Stop reason: HOSPADM

## 2022-07-19 RX ORDER — HALOPERIDOL 5 MG/ML
1 INJECTION INTRAMUSCULAR
Status: DISCONTINUED | OUTPATIENT
Start: 2022-07-19 | End: 2022-07-19 | Stop reason: HOSPADM

## 2022-07-19 RX ORDER — KETOROLAC TROMETHAMINE 30 MG/ML
15 INJECTION, SOLUTION INTRAMUSCULAR; INTRAVENOUS EVERY 6 HOURS
Status: DISCONTINUED | OUTPATIENT
Start: 2022-07-19 | End: 2022-07-20 | Stop reason: HOSPADM

## 2022-07-19 RX ORDER — OXYCODONE HYDROCHLORIDE 5 MG/1
5 TABLET ORAL
Status: DISCONTINUED | OUTPATIENT
Start: 2022-07-19 | End: 2022-07-20 | Stop reason: HOSPADM

## 2022-07-19 RX ORDER — POLYETHYLENE GLYCOL 3350 17 G/17G
1 POWDER, FOR SOLUTION ORAL DAILY
Status: DISCONTINUED | OUTPATIENT
Start: 2022-07-19 | End: 2022-07-20 | Stop reason: HOSPADM

## 2022-07-19 RX ORDER — DOXYCYCLINE HYCLATE 100 MG
100 TABLET ORAL DAILY
Qty: 10 TABLET | Refills: 0 | Status: SHIPPED | OUTPATIENT
Start: 2022-07-19

## 2022-07-19 RX ORDER — DIPHENHYDRAMINE HYDROCHLORIDE 50 MG/ML
12.5 INJECTION INTRAMUSCULAR; INTRAVENOUS
Status: DISCONTINUED | OUTPATIENT
Start: 2022-07-19 | End: 2022-07-19 | Stop reason: HOSPADM

## 2022-07-19 RX ORDER — ATROPA BELLADONNA AND OPIUM 16.2; 6 MG/1; MG/1
60 SUPPOSITORY RECTAL EVERY 12 HOURS PRN
Status: DISCONTINUED | OUTPATIENT
Start: 2022-07-19 | End: 2022-07-20 | Stop reason: HOSPADM

## 2022-07-19 RX ORDER — LIDOCAINE HYDROCHLORIDE 10 MG/ML
INJECTION, SOLUTION EPIDURAL; INFILTRATION; INTRACAUDAL; PERINEURAL
Status: COMPLETED
Start: 2022-07-19 | End: 2022-07-19

## 2022-07-19 RX ORDER — OXYCODONE HCL 5 MG/5 ML
5 SOLUTION, ORAL ORAL
Status: DISCONTINUED | OUTPATIENT
Start: 2022-07-19 | End: 2022-07-19 | Stop reason: HOSPADM

## 2022-07-19 RX ORDER — ONDANSETRON 2 MG/ML
INJECTION INTRAMUSCULAR; INTRAVENOUS PRN
Status: DISCONTINUED | OUTPATIENT
Start: 2022-07-19 | End: 2022-07-19 | Stop reason: SURG

## 2022-07-19 RX ORDER — BUPIVACAINE HYDROCHLORIDE AND EPINEPHRINE 5; 5 MG/ML; UG/ML
INJECTION, SOLUTION EPIDURAL; INTRACAUDAL; PERINEURAL
Status: DISCONTINUED | OUTPATIENT
Start: 2022-07-19 | End: 2022-07-19 | Stop reason: HOSPADM

## 2022-07-19 RX ORDER — ONDANSETRON 2 MG/ML
4 INJECTION INTRAMUSCULAR; INTRAVENOUS EVERY 4 HOURS PRN
Status: DISCONTINUED | OUTPATIENT
Start: 2022-07-19 | End: 2022-07-20 | Stop reason: HOSPADM

## 2022-07-19 RX ORDER — OXYCODONE HYDROCHLORIDE 5 MG/1
5 TABLET ORAL EVERY 4 HOURS PRN
Qty: 12 TABLET | Refills: 0 | Status: SHIPPED | OUTPATIENT
Start: 2022-07-19 | End: 2022-07-26

## 2022-07-19 RX ORDER — PHENAZOPYRIDINE HYDROCHLORIDE 200 MG/1
200 TABLET, FILM COATED ORAL
Status: DISCONTINUED | OUTPATIENT
Start: 2022-07-19 | End: 2022-07-20 | Stop reason: HOSPADM

## 2022-07-19 RX ORDER — SODIUM CHLORIDE, SODIUM LACTATE, POTASSIUM CHLORIDE, CALCIUM CHLORIDE 600; 310; 30; 20 MG/100ML; MG/100ML; MG/100ML; MG/100ML
INJECTION, SOLUTION INTRAVENOUS CONTINUOUS
Status: DISCONTINUED | OUTPATIENT
Start: 2022-07-19 | End: 2022-07-19 | Stop reason: HOSPADM

## 2022-07-19 RX ORDER — CIPROFLOXACIN 500 MG/1
500 TABLET, FILM COATED ORAL 2 TIMES DAILY
Status: ON HOLD | COMMUNITY
End: 2022-07-19

## 2022-07-19 RX ADMIN — DOXYCYCLINE 100 MG: 100 TABLET, FILM COATED ORAL at 18:33

## 2022-07-19 RX ADMIN — FENTANYL CITRATE 100 MCG: 50 INJECTION, SOLUTION INTRAMUSCULAR; INTRAVENOUS at 07:45

## 2022-07-19 RX ADMIN — KETOROLAC TROMETHAMINE 15 MG: 30 INJECTION, SOLUTION INTRAMUSCULAR; INTRAVENOUS at 18:34

## 2022-07-19 RX ADMIN — PROPOFOL 150 MG: 10 INJECTION, EMULSION INTRAVENOUS at 07:45

## 2022-07-19 RX ADMIN — MIDAZOLAM HYDROCHLORIDE 2 MG: 1 INJECTION, SOLUTION INTRAMUSCULAR; INTRAVENOUS at 07:45

## 2022-07-19 RX ADMIN — SODIUM CHLORIDE, POTASSIUM CHLORIDE, SODIUM LACTATE AND CALCIUM CHLORIDE: 600; 310; 30; 20 INJECTION, SOLUTION INTRAVENOUS at 13:23

## 2022-07-19 RX ADMIN — EPHEDRINE SULFATE 10 MG: 50 INJECTION, SOLUTION INTRAVENOUS at 09:44

## 2022-07-19 RX ADMIN — ACETAMINOPHEN 1000 MG: 500 TABLET ORAL at 07:28

## 2022-07-19 RX ADMIN — ONDANSETRON 4 MG: 2 INJECTION INTRAMUSCULAR; INTRAVENOUS at 11:15

## 2022-07-19 RX ADMIN — ROCURONIUM BROMIDE 40 MG: 10 INJECTION, SOLUTION INTRAVENOUS at 07:45

## 2022-07-19 RX ADMIN — SODIUM CHLORIDE, POTASSIUM CHLORIDE, SODIUM LACTATE AND CALCIUM CHLORIDE: 600; 310; 30; 20 INJECTION, SOLUTION INTRAVENOUS at 11:12

## 2022-07-19 RX ADMIN — PROPOFOL 50 MG: 10 INJECTION, EMULSION INTRAVENOUS at 10:16

## 2022-07-19 RX ADMIN — ACETAMINOPHEN 1000 MG: 500 TABLET, FILM COATED ORAL at 18:34

## 2022-07-19 RX ADMIN — ROCURONIUM BROMIDE 10 MG: 10 INJECTION, SOLUTION INTRAVENOUS at 09:04

## 2022-07-19 RX ADMIN — FENTANYL CITRATE 100 MCG: 50 INJECTION, SOLUTION INTRAMUSCULAR; INTRAVENOUS at 09:03

## 2022-07-19 RX ADMIN — PHENAZOPYRIDINE HYDROCHLORIDE 200 MG: 200 TABLET ORAL at 18:33

## 2022-07-19 RX ADMIN — ONDANSETRON 4 MG: 2 INJECTION INTRAMUSCULAR; INTRAVENOUS at 07:45

## 2022-07-19 RX ADMIN — SODIUM CHLORIDE, POTASSIUM CHLORIDE, SODIUM LACTATE AND CALCIUM CHLORIDE: 600; 310; 30; 20 INJECTION, SOLUTION INTRAVENOUS at 05:53

## 2022-07-19 RX ADMIN — VANCOMYCIN HYDROCHLORIDE 1500 MG: 500 INJECTION, POWDER, LYOPHILIZED, FOR SOLUTION INTRAVENOUS at 07:27

## 2022-07-19 RX ADMIN — SUGAMMADEX 200 MG: 100 INJECTION, SOLUTION INTRAVENOUS at 10:37

## 2022-07-19 RX ADMIN — SODIUM CHLORIDE, POTASSIUM CHLORIDE, SODIUM LACTATE AND CALCIUM CHLORIDE: 600; 310; 30; 20 INJECTION, SOLUTION INTRAVENOUS at 09:44

## 2022-07-19 RX ADMIN — SODIUM CHLORIDE, POTASSIUM CHLORIDE, SODIUM LACTATE AND CALCIUM CHLORIDE: 600; 310; 30; 20 INJECTION, SOLUTION INTRAVENOUS at 16:48

## 2022-07-19 RX ADMIN — LIDOCAINE HYDROCHLORIDE 2 ML: 10 INJECTION, SOLUTION EPIDURAL; INFILTRATION; INTRACAUDAL; PERINEURAL at 05:53

## 2022-07-19 RX ADMIN — VANCOMYCIN HYDROCHLORIDE 1 G: 1 INJECTION, POWDER, LYOPHILIZED, FOR SOLUTION INTRAVENOUS at 07:35

## 2022-07-19 ASSESSMENT — COGNITIVE AND FUNCTIONAL STATUS - GENERAL
SUGGESTED CMS G CODE MODIFIER MOBILITY: CK
TOILETING: A LITTLE
HELP NEEDED FOR BATHING: A LITTLE
CLIMB 3 TO 5 STEPS WITH RAILING: A LITTLE
SUGGESTED CMS G CODE MODIFIER DAILY ACTIVITY: CJ
MOVING TO AND FROM BED TO CHAIR: A LITTLE
MOVING FROM LYING ON BACK TO SITTING ON SIDE OF FLAT BED: A LITTLE
TURNING FROM BACK TO SIDE WHILE IN FLAT BAD: A LITTLE
STANDING UP FROM CHAIR USING ARMS: A LITTLE
DRESSING REGULAR LOWER BODY CLOTHING: A LITTLE
MOBILITY SCORE: 18
DAILY ACTIVITIY SCORE: 21
WALKING IN HOSPITAL ROOM: A LITTLE

## 2022-07-19 ASSESSMENT — LIFESTYLE VARIABLES
ALCOHOL_USE: NO
HOW MANY TIMES IN THE PAST YEAR HAVE YOU HAD 5 OR MORE DRINKS IN A DAY: 0
EVER HAD A DRINK FIRST THING IN THE MORNING TO STEADY YOUR NERVES TO GET RID OF A HANGOVER: NO
CONSUMPTION TOTAL: NEGATIVE
AVERAGE NUMBER OF DAYS PER WEEK YOU HAVE A DRINK CONTAINING ALCOHOL: 0
HAVE YOU EVER FELT YOU SHOULD CUT DOWN ON YOUR DRINKING: NO
TOTAL SCORE: 0
HAVE PEOPLE ANNOYED YOU BY CRITICIZING YOUR DRINKING: NO
ON A TYPICAL DAY WHEN YOU DRINK ALCOHOL HOW MANY DRINKS DO YOU HAVE: 0
TOTAL SCORE: 0
EVER FELT BAD OR GUILTY ABOUT YOUR DRINKING: NO
TOTAL SCORE: 0

## 2022-07-19 ASSESSMENT — PATIENT HEALTH QUESTIONNAIRE - PHQ9
2. FEELING DOWN, DEPRESSED, IRRITABLE, OR HOPELESS: NOT AT ALL
SUM OF ALL RESPONSES TO PHQ9 QUESTIONS 1 AND 2: 0
1. LITTLE INTEREST OR PLEASURE IN DOING THINGS: NOT AT ALL

## 2022-07-19 ASSESSMENT — PAIN DESCRIPTION - PAIN TYPE
TYPE: ACUTE PAIN;SURGICAL PAIN
TYPE: SURGICAL PAIN

## 2022-07-19 ASSESSMENT — FIBROSIS 4 INDEX
FIB4 SCORE: 2.13
FIB4 SCORE: 2.13

## 2022-07-19 ASSESSMENT — PAIN SCALES - GENERAL: PAIN_LEVEL: 0

## 2022-07-19 NOTE — ANESTHESIA TIME REPORT
Anesthesia Start and Stop Event Times     Date Time Event    7/19/2022 0633 Ready for Procedure     0735 Anesthesia Start     1056 Anesthesia Stop        Responsible Staff  07/19/22    Name Role Begin End    Armaan Smith M.D. Anesth 0735 1056        Overtime Reason:  no overtime (within assigned shift)    Comments:

## 2022-07-19 NOTE — OP REPORT
[facility] Operative Report  Pre-operative Diagnosis: 1. Severe BPH with bladder outlet obstruction  2. Urinary retention   DATE of procedure 7/19/22   Post-operative Diagnosis: Same as above   Procedure 1. Robotic Assisted Simple Prostatectomy   Attending: Jordon Garcia M.D.,   Assistant: Tamica ROSENBERG   Anesthesia: Et, General  Anesthesiologist:  Luis ARELLANO    Estimated Blood Loss: 100cc   IV fluids See anesthesia Lcrystalloid   Specimens: 1. Prostate adenoma   Drains: 1. 15F faith drain in RLQ  2. 20F three way boyer with CBI and >30cc in balloon   Complications: None   Wound class II clean contaminated   Condition: Stable, procedure well tolerated    Disposition:  PACU, DC POD1 if doing well, boyer x7 days.  Does not need cystogram prior to boyer removal post op.     Findings: 1. Very Large median lobe, Moderate bilateral lateral lobes, normal anatomy, adenoma removed.  2. 72g of adenoma removed     Indications for Procedure:  89yo male with long standing urinary retention, having failed voiding trials and despite medical management was catheter dependent. He had 130cc prostate based on TRUS and very large median lobe intravesical component on cystoscopy during workup of his retention. After a full discussion of alternatives, risks, and benefits the patient consented to proceeding with  robotic assisted simple prostatectomy. Risks of procedure discussed but not limited to included injury to surrounding organs structures, infection, bleeding, air embolus, post op abscess requiring drain, incontinence, ED, possible urine leak requiring extended catheter, hernia, and the cardiovascular and pulmonary complications of surgery including MI, PE, DVT etc.     Procedure in Detail:  The patient was brought into the operating room and placed on the table supine. General anesthesia was induced, and he was placed in the lithotomy position. He was secured to the table and all pressure points protected.  Ampicillin/Gentamicin IV was given for perioperative antibiotics. SCDs were placed. A time out was performed to confirm correct patient and procedure. He was placed in reverse trendelenberg position. After routine prepping and draping, an 16-French Boyer catheter was placed into the urethra.         We started by making an 8mm vertical incision superior to the umbilicus. A Veress needle was introduced through the incision and after confirming a low initial pressure and passing the drop test, the abdomen was insufflated. A 8mm port was placed through the incision carefully into the peritoneum. The abdominal cavity was inspected and there was no evidence of any injury to the bowel or vascular structures. The additional ports were then placed as follows: an 8 mm Da Bia port 8 mm lateral to the left of the camera port and two 8 mm Da Bia ports to the right of the camera port. A 12 mm assistant port was placed on the left side.       The Da Bia Xi robot was then docked.  The bladder was then distended with 200 to 300 cc of sterile saline.  The bladder was opened near the dome or just below with a vertical incision.  The fluid was drained from the bladder and the bladder limits were inspected.  The incision was extended transversely until the prostate could be adequately visualized.  This incision the boyer catheter was pulled anteriorly and the bladder incision was extended bilaterally to open the bladder enough to allow good visualization of bilateral ureteral orifices and the enlarged prostate including median lobe.     We made incision along the inferior aspect of median lobe taking care to be well away from ureteral orifices. We carried this down onto the adenoma finding the more avascular plane between prostate capsule and adenoma. This was developed posteriorly and extended circumferentially around the median and lateral lobes taking down overlying bladder as needed to extend this around.  We continued with  combination of  Blunt and electrocautery to bring this all the way around anteriorly as we shelled out the adenoma. Once we began to approach apex of prostate a robotic tenaculum instrument was used to retract the prostate.  After adequately mobilized, we came through urethra with cautery while continuing to sweep back the adenoma.  Once through the urethra the remaining posterior or lateral attachments were freed, and the adenoma was placed in a 10mm endocatch retrieval bag.  Hemostasis was then achieved by using spot monopolar and bipolar cautery on any obvious bleeders.  A double-armed 9 inch strata fix x2 suture with RB1 needles was used to reapproximate the bladder neck down to the posterior urethral lip as a re-trigonization of the bladder, ran completely circumferentially. Insufllation was reduced and there was adequate hemostasis. surgilfo was applied in the bilateral pedical regions.  At this point the 20F boyer catheter was inserted and we moved on to bladder closure. The bladder was closed with 2-0 stratafix 9inch suture on SH needle taking full-thickness bladder bites and running two sutures from both lateral aspects which were tied in the middle.  The balloon was inflated to 40cc, and the closure tested with 120 cc of saline without any significant leak.  A 15F round drain was then placed through one of the robotic parts and secured in position with permanent suture. The robot was undocked at this point.    The fascia of the 12m assistant port was closed with 2-0 vicryl fascial stitch.  The umbilical port site was extended and fascia divided until the specimen could be easily removed.  The midline extraction site fascia was closed with figure of 8 series of 0-PDS. The subcutaneous tissues of the midline extraction site were reapproximated with a 2-0 running vicryl. The skin of all the larger sites were then closed with subcuticular 4-0 monocryl stitches and the small port sites were dressed with  dermabond. Marcaine 0.25% was injected at all incision sites for local anesthesia. The Alex-Jones drain was placed on bulb suction and the Ma catheter was placed on gravity drainage with light CBI.     The patient tolerated this procedure well and awoke from anesthesia uneventfully. He was taken to the recovery room in stable condition. All needle, sponge, and instrument counts were correct x 2.       Jordon Garcia MD.  [place] [address]  [phone]

## 2022-07-19 NOTE — OR NURSING
PROSTATECTOMY, SIMPLE, ROBOT-ASSISTED, USING DA RACHELLE XI - LAPAROSCOPIC  Jordon Garcia M.D.   Armaan Smith M.D., Anesthesiologist  --------------------------------------------  1052: Patient arrived to PACU from OR via gurney. Report received from anesthesia and RN. Respirations are spontaneous and unlabored. Dressing is CDI. Lap sites open to air with no drainage. VSS on 6L. Boyer bag emptied by OR team prior to arriving to PACU. Boyer emptied now to accurately monitor input/output. Urine in boyer bag is flowing and is light pink with no clots.     1055: Pt still waking up.    1110: Still resting    1115: Pt slightly more awake. No c/o pain. C/o nausea. See MAR.     1125: Dressing CDI. Urinary ouput from boyer flowing, light pink.     1140: Pt fully alert. No c/o pain or nausea.    1145: Criteria for transfer. Report called to Lou FERNANDEZ. Family updated.

## 2022-07-19 NOTE — ANESTHESIA POSTPROCEDURE EVALUATION
Patient: Ean Ballesteros    Procedure Summary     Date: 07/19/22 Room / Location: [place] [id number] / SURGERY [facility]    Anesthesia Start: 0735 Anesthesia Stop: 1056    Procedure: PROSTATECTOMY, SIMPLE, ROBOT-ASSISTED, USING DA RACHELLE XI - LAPAROSCOPIC (N/A Abdomen) Diagnosis: (RETENTION OF URINE, BENIGN PROSTATIC HYPERTROPHY)    Surgeons: Jordon Garcia M.D. Responsible Provider: Armaan Smith M.D.    Anesthesia Type: general ASA Status: 2          Final Anesthesia Type: general  Last vitals  BP   Blood Pressure : (!) 188/90    Temp   36.6 °C (97.9 °F)    Pulse   61   Resp   16    SpO2   96 %      Anesthesia Post Evaluation    Patient location during evaluation: PACU  Patient participation: complete - patient participated  Level of consciousness: awake and alert  Pain score: 0    Airway patency: patent  Anesthetic complications: no  Cardiovascular status: hemodynamically stable  Respiratory status: acceptable  Hydration status: euvolemic    PONV: none          There were no known complications for this encounter.     Nurse Pain Score: 0 (NPRS)

## 2022-07-19 NOTE — ANESTHESIA PREPROCEDURE EVALUATION
Case: [id number] Date/Time: 07/19/22 0715    Procedure: PROSTATECTOMY, SIMPLE, ROBOT-ASSISTED, USING DA RACHELLE XI - LAPAROSCOPIC    Pre-op diagnosis: RETENTION OF URINE    Location:  OR  / SURGERY [facility]    Surgeons: Jordon Garcia M.D.          Relevant Problems   No relevant active problems       Physical Exam    Airway   Mallampati: II  TM distance: >3 FB  Neck ROM: full       Cardiovascular - normal exam  Rhythm: regular  Rate: normal  (-) murmur     Dental - normal exam           Pulmonary - normal exam  Breath sounds clear to auscultation     Abdominal    Neurological - normal exam                 Anesthesia Plan    ASA 2       Plan - general       Airway plan will be ETT          Induction: intravenous    Postoperative Plan: Postoperative administration of opioids is intended.    Pertinent diagnostic labs and testing reviewed    Informed Consent:    Anesthetic plan and risks discussed with patient.    Use of blood products discussed with: patient whom consented to blood products.

## 2022-07-19 NOTE — PROGRESS NOTES
4 Eyes Skin Assessment Completed by Lou RN and JIM Santiago.    Head WDL  Ears WDL  Nose WDL  Mouth WDL  Neck WDL  Breast/Chest WDL  Shoulder Blades WDL  Spine WDL  (R) Arm/Elbow/Hand WDL  (L) Arm/Elbow/Hand WDL  Abdomen Incision, X4 lap sites with dermabond, DIANA to left lower ABD  Groin WDL  Scrotum/Coccyx/Buttocks WDL  (R) Leg WDL  (L) Leg WDL  (R) Heel/Foot/Toe WDL  (L) Heel/Foot/Toe WDL          Devices In Places Blood Pressure Cuff, Pulse Ox and Feli Ma      Interventions In Place Pressure Redistribution Mattress    Possible Skin Injury No    Pictures Uploaded Into Epic N/A  Wound Consult Placed N/A  RN Wound Prevention Protocol Ordered No

## 2022-07-19 NOTE — OR NURSING
0600  Pt allergies and NPO status verified.  Home medications reconciled.  Belongings secured.  Pt verbalizes understanding of pain scale; expected course of stay and plan of care.  Surgical site verified with pt.  IV access established.  Triple AIM completed. All questions answered.  Bed in low position.  Call light in reach.

## 2022-07-19 NOTE — DISCHARGE INSTRUCTIONS
"DR. Garcia's DISCHARGE INSTRUCTINS FOLLOWING   ROBOTIC SIMPLE PROSTATECTOMY       DIET:  You can resume your regular diet. We encourage you to eat well-balanced and nutritious meals. Start slow with liquids and soups then work your way up to regular food.     ACTIVITY:  Please restrain from strenuous activity or heavy lifting (more than 10 pounds) for the next 4 week(s). You should be able to resume light work after a week. Please walk daily as much as tolerated, making exercise a part of your daily life. Do not drive while using narcotics for pain control.     WOUND CARE:  1. For the next 3 to 4 weeks, please keep the incisions clean, dry and open to air when possible.  You have absorbable sutures that do not need to be removed.  You are advised not to pick, scratch or scrub the incisions.   2. Keep the dressing over the smaller incision (where the drain had been) for 24 hours and then remove this dressing. This incision will eventually close on its own.  Keep the incision clean, dry and open to air when possible.  3. Once the drain dressing is removed, light showers are fine as long as the incisions are dabbed dry and there is no prolonged water exposure.  Please avoid bathing or submersion of the wounds for 4 weeks.    CATHETER CARE:  Take care of your urethral catheter (“boyer”) as you were taught in the hospital.  The purpose of the boyer catheter is to drain your bladder while the operated area heals. Your boyer will be removed at your follow-up appointment approximately 7-10 days following your surgery following a \"cystogram\" study of the bladder.     MEDICATIONS:  1. Please use tylenol and/or advil as need for pain control. Please use oxycodone as prescribed for pain refractory to these meds. Use stool softeners (miralax and colace) as prescribed to prevent constipation.  2. If you are using aspirin, Plavix, or coumadin, please don’t restart these medications until two days after your discharge if you are " not having large amounts of blood in the urine.  3. Antibiotic.  You have been prescribed doxycycline once a day for while your boyer catheter is in place.      FOLLOW-UP:  We will call you to schedule your follow up appointment with Dr. Garcia or his physician assistant in 7-10 days. If you have not heard from us in 1-2 business days, please call [phone] to schedule your follow-up appointment. You may also contact this number if you have questions or concerns that can be answered by Dr. Garcia's staff.      WARNING SIGNS:  Problems with boyer catheter, Fever greater than 101 degrees F, chest pain, shortness of breath, chills, nausea or vomiting, Large amount of clots in urine that make it difficult  for urine to drain from boyer, increasing pain, or abdominal swelling. If you are experiencing these symptoms, call the Urology Clinic or go to emergency room.    It is normal to see blood in your urine for up to 2 weeks even from surgery. The urine may clear up entirely, and then turn bloody again a few days later depending on your activity level; do not be alarmed. However, if you experience severe pain or tenderness, have a lot of increased bleeding, or find that you are unable to urinate because of large clots, please notify your doctor immediately      MEDICAL HELP DURING NORMAL BUSINESS HOURS:  Between the hours of 8 AM and 5 PM, please call [phone] to speak with 's staff.     MEDICAL HELP AFTER HOURS:  If you have a serious emergency such as chest pain, shortness of breath, relentless pain you should call 911. For other urgent problems after hours you may contact the urology physician on call by phoning the [phone]. You may also visit the Emergency room at local [facility] for help.     For non-emergent problems such as prescription refills or routine questions, please do your best to contact us during normal business hours. This after-hours number should be used for urgent or emergent  questions only.       Jordon Garcia M.D.   [address] [place], [place] [address] [phone]

## 2022-07-19 NOTE — ANESTHESIA PROCEDURE NOTES
Airway    Date/Time: 7/19/2022 7:45 AM  Performed by: Armaan Smith M.D.  Authorized by: Armaan Smith M.D.     Location:  OR  Urgency:  Elective  Indications for Airway Management:  Anesthesia      Spontaneous Ventilation: absent    Sedation Level:  Deep  Preoxygenated: Yes    Patient Position:  Sniffing  Final Airway Type:  Endotracheal airway  Final Endotracheal Airway:  ETT  Cuffed: Yes    Technique Used for Successful ETT Placement:  Direct laryngoscopy    Insertion Site:  Oral  Blade Type:  Renee  Laryngoscope Blade/Videolaryngoscope Blade Size:  2  ETT Size (mm):  8.0  Measured from:  Teeth  ETT to Teeth (cm):  22  Placement Verified by: auscultation and capnometry    Cormack-Lehane Classification:  Grade I - full view of glottis  Number of Attempts at Approach:  1

## 2022-07-20 VITALS
DIASTOLIC BLOOD PRESSURE: 70 MMHG | HEART RATE: 61 BPM | HEIGHT: 62 IN | BODY MASS INDEX: 30.83 KG/M2 | SYSTOLIC BLOOD PRESSURE: 132 MMHG | OXYGEN SATURATION: 94 % | TEMPERATURE: 98 F | WEIGHT: 167.55 LBS | RESPIRATION RATE: 18 BRPM

## 2022-07-20 LAB
ANION GAP SERPL CALC-SCNC: 9 MMOL/L (ref 7–16)
BUN SERPL-MCNC: 13 MG/DL (ref 8–22)
CALCIUM SERPL-MCNC: 7.6 MG/DL (ref 8.4–10.2)
CHLORIDE SERPL-SCNC: 107 MMOL/L (ref 96–112)
CO2 SERPL-SCNC: 21 MMOL/L (ref 20–33)
CREAT SERPL-MCNC: 0.9 MG/DL (ref 0.5–1.4)
ERYTHROCYTE [DISTWIDTH] IN BLOOD BY AUTOMATED COUNT: 47.6 FL (ref 35.9–50)
GFR SERPLBLD CREATININE-BSD FMLA CKD-EPI: 81 ML/MIN/1.73 M 2
GLUCOSE SERPL-MCNC: 98 MG/DL (ref 65–99)
HCT VFR BLD AUTO: 37.6 % (ref 42–52)
HGB BLD-MCNC: 12.5 G/DL (ref 14–18)
MCH RBC QN AUTO: 31.1 PG (ref 27–33)
MCHC RBC AUTO-ENTMCNC: 33.2 G/DL (ref 33.7–35.3)
MCV RBC AUTO: 93.5 FL (ref 81.4–97.8)
PLATELET # BLD AUTO: 174 K/UL (ref 164–446)
PMV BLD AUTO: 9.4 FL (ref 9–12.9)
POTASSIUM SERPL-SCNC: 3.9 MMOL/L (ref 3.6–5.5)
RBC # BLD AUTO: 4.02 M/UL (ref 4.7–6.1)
SODIUM SERPL-SCNC: 137 MMOL/L (ref 135–145)
WBC # BLD AUTO: 6.6 K/UL (ref 4.8–10.8)

## 2022-07-20 PROCEDURE — 700102 HCHG RX REV CODE 250 W/ 637 OVERRIDE(OP): Performed by: UROLOGY

## 2022-07-20 PROCEDURE — G0378 HOSPITAL OBSERVATION PER HR: HCPCS

## 2022-07-20 PROCEDURE — 80048 BASIC METABOLIC PNL TOTAL CA: CPT

## 2022-07-20 PROCEDURE — 85027 COMPLETE CBC AUTOMATED: CPT

## 2022-07-20 PROCEDURE — 700111 HCHG RX REV CODE 636 W/ 250 OVERRIDE (IP): Performed by: UROLOGY

## 2022-07-20 PROCEDURE — 36415 COLL VENOUS BLD VENIPUNCTURE: CPT

## 2022-07-20 PROCEDURE — A9270 NON-COVERED ITEM OR SERVICE: HCPCS | Performed by: UROLOGY

## 2022-07-20 PROCEDURE — 97161 PT EVAL LOW COMPLEX 20 MIN: CPT

## 2022-07-20 PROCEDURE — 96376 TX/PRO/DX INJ SAME DRUG ADON: CPT

## 2022-07-20 PROCEDURE — 97165 OT EVAL LOW COMPLEX 30 MIN: CPT

## 2022-07-20 RX ORDER — PHENAZOPYRIDINE HYDROCHLORIDE 200 MG/1
200 TABLET, FILM COATED ORAL
Qty: 6 TABLET | Refills: 0 | Status: SHIPPED | OUTPATIENT
Start: 2022-07-20 | End: 2022-07-21

## 2022-07-20 RX ADMIN — POLYETHYLENE GLYCOL 3350 1 PACKET: 17 POWDER, FOR SOLUTION ORAL at 05:15

## 2022-07-20 RX ADMIN — PHENAZOPYRIDINE HYDROCHLORIDE 200 MG: 200 TABLET ORAL at 09:13

## 2022-07-20 RX ADMIN — KETOROLAC TROMETHAMINE 15 MG: 30 INJECTION, SOLUTION INTRAMUSCULAR; INTRAVENOUS at 05:16

## 2022-07-20 RX ADMIN — ACETAMINOPHEN 1000 MG: 500 TABLET, FILM COATED ORAL at 00:14

## 2022-07-20 RX ADMIN — FUROSEMIDE 40 MG: 40 TABLET ORAL at 05:16

## 2022-07-20 RX ADMIN — DOXYCYCLINE 100 MG: 100 TABLET, FILM COATED ORAL at 05:15

## 2022-07-20 RX ADMIN — KETOROLAC TROMETHAMINE 15 MG: 30 INJECTION, SOLUTION INTRAMUSCULAR; INTRAVENOUS at 00:15

## 2022-07-20 RX ADMIN — ACETAMINOPHEN 1000 MG: 500 TABLET, FILM COATED ORAL at 05:15

## 2022-07-20 ASSESSMENT — COGNITIVE AND FUNCTIONAL STATUS - GENERAL
TOILETING: A LOT
WALKING IN HOSPITAL ROOM: A LITTLE
DRESSING REGULAR LOWER BODY CLOTHING: A LITTLE
TURNING FROM BACK TO SIDE WHILE IN FLAT BAD: A LITTLE
MOVING TO AND FROM BED TO CHAIR: A LITTLE
SUGGESTED CMS G CODE MODIFIER MOBILITY: CK
MOBILITY SCORE: 18
STANDING UP FROM CHAIR USING ARMS: A LITTLE
SUGGESTED CMS G CODE MODIFIER DAILY ACTIVITY: CK
HELP NEEDED FOR BATHING: A LITTLE
DAILY ACTIVITIY SCORE: 18
MOVING FROM LYING ON BACK TO SITTING ON SIDE OF FLAT BED: A LITTLE
PERSONAL GROOMING: A LITTLE
DRESSING REGULAR UPPER BODY CLOTHING: A LITTLE
CLIMB 3 TO 5 STEPS WITH RAILING: A LITTLE

## 2022-07-20 ASSESSMENT — GAIT ASSESSMENTS
DEVIATION: SHUFFLED GAIT;BRADYKINETIC
ASSISTIVE DEVICE: FRONT WHEEL WALKER
DISTANCE (FEET): 120
DISTANCE (FEET): 120
GAIT LEVEL OF ASSIST: STANDBY ASSIST

## 2022-07-20 ASSESSMENT — ACTIVITIES OF DAILY LIVING (ADL): TOILETING: INDEPENDENT

## 2022-07-20 ASSESSMENT — PAIN DESCRIPTION - PAIN TYPE: TYPE: SURGICAL PAIN

## 2022-07-20 NOTE — THERAPY
Physical Therapy   Initial Evaluation     Patient Name: Ean Ballesteros  Age:  89 y.o., Sex:  male  Medical Record #: [id number]  Today's Date: 7/20/2022     Precautions  Precautions: Fall Risk  Comments: abdominal incision, constantin drain    Assessment  Patient is 89 y.o. male with a diagnosis of BPH with urine retention s/p RASP 7/19/22. Pt is presenting with mild balance impairments due to decreased mobility/ hospital stay.  When ambulating with FWW gait and balance are much improved. Recommend pt use FWW at home, pt is in agreement. Gait training completed with pt. There are no further acute PT needs, pt plans to DC home with family assist today, DC PT.       Plan    Recommend Physical Therapy for Evaluation only     DC Equipment Recommendations: Front-Wheel Walker, Tub / Shower Seat  Discharge Recommendations: Anticipate that the patient will have no further physical therapy needs after discharge from the hospital        07/20/22 1104   Prior Living Situation   Housing / Facility 1 Story Apartment / Condo   Steps Into Home 0   Steps In Home 0   Bathroom Set up Bathtub / Shower Combination   Equipment Owned None   Lives with - Patient's Self Care Capacity Adult Children   Comments lives wtih dtr who is supportive, she works but other family will be with pt while he recovers   Prior Level of Functional Mobility   Bed Mobility Independent   Transfer Status Independent   Ambulation Independent   Assistive Devices Used None   Stairs Independent   Cognition    Comments Pt is Barbadian speaking, son available to interpret. pt is oriented x4   Passive ROM Lower Body   Passive ROM Lower Body Not Tested   Active ROM Lower Body    Active ROM Lower Body  WDL   Strength Lower Body   Lower Body Strength  WDL   Balance Assessment   Sitting Balance (Static) Good   Sitting Balance (Dynamic) Fair +   Standing Balance (Static) Fair   Standing Balance (Dynamic) Fair -   Weight Shift Sitting Fair   Weight Shift Standing Fair    Comments improved balance with FWW   Gait Analysis   Gait Level Of Assist Standby Assist   Assistive Device Front Wheel Walker   Distance (Feet) 120   # of Times Distance was Traveled 1   Deviation Shuffled Gait;Bradykinetic   Weight Bearing Status WBAT L LE   Comments Pt ambulated 20 ft with Katherine, no AD but required HHA for balance   Bed Mobility    Supine to Sit Minimal Assist   Functional Mobility   Sit to Stand Contact Guard Assist

## 2022-07-20 NOTE — DISCHARGE SUMMARY
Discharge Summary    CHIEF COMPLAINT ON ADMISSION  Urine retention w/ BPH    Reason for Admission  RETENTION OF URINE     Admission Date  7/19/2022    CODE STATUS  Full Code    HPI & HOSPITAL COURSE  This is a 89 y.o. male here with BPH with retention s/p RASP on 7/19/22. Post operatively, his urine is clear yellow off CBI. He is tolerating a normal diet without N/V. He is passing gas. His pain is well controlled. He is ambulating without issue.        Therefore, he is discharged in good and stable condition to home with close outpatient follow-up.    The patient recovered much more quickly than anticipated on admission.    Discharge Date  7/20/22    FOLLOW UP ITEMS POST DISCHARGE  Will f/u in office in 1 week for TOV and wound check. Our office will arrange.     DISCHARGE DIAGNOSES  Active Problems:    Urinary retention due to benign prostatic hyperplasia POA: Yes  Resolved Problems:    * No resolved hospital problems. *      FOLLOW UP  No future appointments.  No follow-up provider specified.    MEDICATIONS ON DISCHARGE     Medication List      START taking these medications      Instructions   oxyCODONE immediate-release 5 MG Tabs  Commonly known as: ROXICODONE   Take 1 Tablet by mouth every four hours as needed for Severe Pain for up to 7 days.  Dose: 5 mg     phenazopyridine 200 MG Tabs  Commonly known as: PYRIDIUM   Take 1 Tablet by mouth 3 times a day with meals for 1 day.  Dose: 200 mg        CHANGE how you take these medications      Instructions   doxycycline 100 MG Tabs  What changed: when to take this  Commonly known as: VIBRAMYCIN   Take 1 Tablet by mouth every day.  Dose: 100 mg        CONTINUE taking these medications      Instructions   furosemide 40 MG Tabs  Commonly known as: LASIX   furosemide 40 mg tablet   TAKE 1 TABLET BY MOUTH DAILY     meloxicam 15 MG tablet  Commonly known as: MOBIC   Take 15 mg by mouth every day.  Dose: 15 mg        STOP taking these medications    ciprofloxacin 500 MG  Tabs  Commonly known as: CIPRO     tamsulosin 0.4 MG capsule  Commonly known as: FLOMAX            Allergies  No Known Allergies    DIET  Orders Placed This Encounter   Procedures   • Diet Order Diet: Regular     Standing Status:   Standing     Number of Occurrences:   1     Order Specific Question:   Diet:     Answer:   Regular [1]       ACTIVITY  As tolerated.  10-15-lb lifting restriction    CONSULTATIONS  None    PROCEDURES  S/p RASP on 7/19/22.     LABORATORY  Lab Results   Component Value Date    SODIUM 137 07/20/2022    POTASSIUM 3.9 07/20/2022    CHLORIDE 107 07/20/2022    CO2 21 07/20/2022    GLUCOSE 98 07/20/2022    BUN 13 07/20/2022    CREATININE 0.90 07/20/2022    CREATININE 1.2 01/20/2009        Lab Results   Component Value Date    WBC 6.6 07/20/2022    HEMOGLOBIN 12.5 (L) 07/20/2022    HEMATOCRIT 37.6 (L) 07/20/2022    PLATELETCT 174 07/20/2022        Total time of the discharge process exceeds 35 minutes.

## 2022-07-20 NOTE — CARE PLAN
The patient is Stable - Low risk of patient condition declining or worsening    Shift Goals  Clinical Goals: Patient will have clear urine with no clots.  Patient Goals: Patient will discharge home today  Family Goals: n/a    Progress made toward(s) clinical / shift goals:  Patient CBI is running with no complications. Urine is yellow/orange, clear, with no clots visible. Patient has no complaints of pain. CHET Mcbride has medically cleared patient to discharge home with family care. Patient to discharge with boyer catheter and follow up in office with urology staff. Nursing staff to discharge DIANA drain before discharge.     Patient is not progressing towards the following goals:

## 2022-07-20 NOTE — THERAPY
"Occupational Therapy   Initial Evaluation     Patient Name: Ena Ballesteros  Age:  89 y.o., Sex:  male  Medical Record #: [id number]  Today's Date: 7/20/2022     Precautions  Comments: abdominal incision, constantin drain    Assessment  Patient is 89 y.o. male with a diagnosis of BPF s/p RASP on 7/19.  Has boyer and CONSTANTIN drain currently.  Pt tolerated bed mobility and walk in the belle.  He was unsteady with hand held assist but did much better with FWW.  He required min or more assist with LB dressing and boyer mgmt.  Son present for education and reports pt has lots of family assist lined up to help at home.  Pt appears safe for home with family assist.  May benefit from shower chair until balance improves.  Son will provide if needed.  No further OT needs in this setting at this time.        Plan    Recommend Occupational Therapy for Evaluation only     DC Equipment Recommendations: Front-Wheel Walker, Tub / Shower Seat  Discharge Recommendations: Anticipate that the patient will have no further occupational therapy needs after discharge from the hospital     Subjective    \"I have no pain\"     Objective       07/20/22 1105   Prior Living Situation   Prior Services Home-Independent   Housing / Facility 1 Story Apartment / Condo   Steps Into Home 0   Steps In Home 0   Bathroom Set up Bathtub / Shower Combination   Equipment Owned None   Lives with - Patient's Self Care Capacity Adult Children   Comments lives with dtr who works, but son at bedside states there are several family members locally that can take turns being avail to help him around the clock   Prior Level of ADL Function   Self Feeding Independent   Grooming / Hygiene Independent   Bathing Independent   Dressing Independent   Toileting Independent   Prior Level of IADL Function   Medication Management Unable To Determine At This Time   Laundry Requires Assist   Kitchen Mobility Requires Assist   Finances Unable To Determine At This Time   Home Management " Requires Assist   Shopping Unable To Determine At This Time   Prior Level Of Mobility Independent Without Device in Community;Independent Without Device in Home   Driving / Transportation Unable To Determine At This Time   Occupation (Pre-Hospital Vocational) Retired Due To Age   Precautions   Comments abdominal incision, constantin drain   Vitals   O2 Delivery Device None - Room Air   Pain 0 - 10 Group   Therapist Pain Assessment 0;During Activity;Nurse Notified   Cognition    Cognition / Consciousness WDL   Level of Consciousness Alert   Comments conversatn in Hebrew with therapist and son to help interpret   Active ROM Upper Body   Active ROM Upper Body  WDL   Strength Upper Body   Upper Body Strength  WDL   Balance Assessment   Sitting Balance (Static) Good   Sitting Balance (Dynamic) Good   Standing Balance (Static) Fair   Standing Balance (Dynamic) Fair -   Weight Shift Sitting Fair   Weight Shift Standing Fair   Comments balance better with FWW in standing   Bed Mobility    Supine to Sit Minimal Assist   Scooting Supervised   ADL Assessment   Eating Modified Independent   Upper Body Dressing Minimal Assist   Lower Body Dressing Moderate Assist   Toileting Maximal Assist  (boyer)   How much help from another person does the patient currently need...   Putting on and taking off regular lower body clothing? 3   Bathing (including washing, rinsing, and drying)? 3   Toileting, which includes using a toilet, bedpan, or urinal? 2   Putting on and taking off regular upper body clothing? 3   Taking care of personal grooming such as brushing teeth? 3   Eating meals? 4   6 Clicks Daily Activity Score 18   Functional Mobility   Sit to Stand Contact Guard Assist   Transfer Method Stand Step   Mobility Jenna initially without device, SBA with FWW   Distance (Feet) 120   # of Times Distance was Traveled 1   Visual Perception   Visual Perception  WDL   Edema / Skin Assessment   Edema / Skin  Not Assessed   Activity Tolerance    Sitting Edge of Bed 25   Standing 8   Patient / Family Goals   Patient / Family Goal #1 home   Education Group   Education Provided Role of Occupational Therapist;Activities of Daily Living;Adaptive Equipment   Role of Occupational Therapist Patient Response Patient;Family;Acceptance;Explanation;Verbal Demonstration   ADL Patient Response Patient;Family;Acceptance;Explanation;Verbal Demonstration   Adaptive Equipment Patient Response Patient;Family;Acceptance;Explanation;Verbal Demonstration   Additional Comments Rec use of FWW for now and may need shower chair unless he chooses to sponge bathe for a bit.  Pt/family also educated loosely on mgmt of leg bag with LB dressing and that pt might need help with LB dressing for a bit due to pain

## 2022-07-20 NOTE — PROGRESS NOTES
Patient is being discharged home. Patient is A&Ox4. IV removed. Discharge instructions provided to patient and reviewed with him and his son. Both verbalized understanding and all questions and concerns were addressed. Patient provided a FWW, a night bag, and leg bag. All belongings were packed and taken with. Patient escorted off unit by staff.

## 2022-07-20 NOTE — DISCHARGE PLANNING
Anticipated Discharge Disposition: Home with DME walker    Action: LSW notified that pt will need a walker for home. Pt has insurance that contracts with [employer]. LSW faxed choice form for [facility] to [facility] for continuity of care. LSW notified RN to get walker from storage once walker order is placed.    Barriers to Discharge: None    Plan: LSW to follow and assist as needed.

## 2022-07-20 NOTE — PROGRESS NOTES
Assumed care of patient. Bedside report received from night shift RN. Patient updated on plan of care. Patient instructed to call for assistance before getting out of bed. Patient verbalized understanding. Call light is within reach and fall precautions are in place. All needs met at this time. Hourly rounding in place.

## 2022-07-20 NOTE — PROGRESS NOTES
Received in bed, aox3, Portuguese speaking only.. Assessment as per [facility]. Call light within reach. Needs attended. Plan of care discussed and understood. On CBI draining grapefruit color out put.    0336: urine is clear yellow, orange.

## 2022-07-20 NOTE — CARE PLAN
The patient is Stable - Low risk of patient condition declining or worsening    Shift Goals  Clinical Goals: urine output will be clear, no blood clots  Patient Goals: sleep for several hours  Family Goals: n/a    Progress made toward(s) clinical / shift goals:  on CBI, urine is now yellow- orange, clear, no blood clots. Slept well through the night, no distress.      Problem: Knowledge Deficit - Standard  Goal: Patient and family/care givers will demonstrate understanding of plan of care, disease process/condition, diagnostic tests and medications  Outcome: Progressing     Problem: Pain - Standard  Goal: Alleviation of pain or a reduction in pain to the patient’s comfort goal  Outcome: Progressing     Problem: Urinary Elimination  Goal: Establish and maintain regular urinary output  Outcome: Progressing

## 2023-12-22 ENCOUNTER — HOSPITAL ENCOUNTER (OUTPATIENT)
Dept: LAB | Facility: MEDICAL CENTER | Age: 88
End: 2023-12-22
Attending: NURSE PRACTITIONER
Payer: MEDICARE

## 2023-12-22 LAB
25(OH)D3 SERPL-MCNC: 26 NG/ML (ref 30–100)
ALBUMIN SERPL BCP-MCNC: 4.3 G/DL (ref 3.2–4.9)
ALBUMIN/GLOB SERPL: 1.7 G/DL
ALP SERPL-CCNC: 63 U/L (ref 30–99)
ALT SERPL-CCNC: 11 U/L (ref 2–50)
ANION GAP SERPL CALC-SCNC: 10 MMOL/L (ref 7–16)
AST SERPL-CCNC: 22 U/L (ref 12–45)
BASOPHILS # BLD AUTO: 0.5 % (ref 0–1.8)
BASOPHILS # BLD: 0.03 K/UL (ref 0–0.12)
BILIRUB SERPL-MCNC: 1.2 MG/DL (ref 0.1–1.5)
BUN SERPL-MCNC: 12 MG/DL (ref 8–22)
CALCIUM ALBUM COR SERPL-MCNC: 9 MG/DL (ref 8.5–10.5)
CALCIUM SERPL-MCNC: 9.2 MG/DL (ref 8.5–10.5)
CHLORIDE SERPL-SCNC: 105 MMOL/L (ref 96–112)
CHOLEST SERPL-MCNC: 208 MG/DL (ref 100–199)
CO2 SERPL-SCNC: 24 MMOL/L (ref 20–33)
CREAT SERPL-MCNC: 0.96 MG/DL (ref 0.5–1.4)
EOSINOPHIL # BLD AUTO: 0.1 K/UL (ref 0–0.51)
EOSINOPHIL NFR BLD: 1.8 % (ref 0–6.9)
ERYTHROCYTE [DISTWIDTH] IN BLOOD BY AUTOMATED COUNT: 48.6 FL (ref 35.9–50)
FOLATE SERPL-MCNC: 5.8 NG/ML
GFR SERPLBLD CREATININE-BSD FMLA CKD-EPI: 75 ML/MIN/1.73 M 2
GLOBULIN SER CALC-MCNC: 2.6 G/DL (ref 1.9–3.5)
GLUCOSE SERPL-MCNC: 93 MG/DL (ref 65–99)
HCT VFR BLD AUTO: 49.4 % (ref 42–52)
HDLC SERPL-MCNC: 45 MG/DL
HGB BLD-MCNC: 16.5 G/DL (ref 14–18)
IMM GRANULOCYTES # BLD AUTO: 0.01 K/UL (ref 0–0.11)
IMM GRANULOCYTES NFR BLD AUTO: 0.2 % (ref 0–0.9)
LDLC SERPL CALC-MCNC: 138 MG/DL
LYMPHOCYTES # BLD AUTO: 2.71 K/UL (ref 1–4.8)
LYMPHOCYTES NFR BLD: 48 % (ref 22–41)
MCH RBC QN AUTO: 31.4 PG (ref 27–33)
MCHC RBC AUTO-ENTMCNC: 33.4 G/DL (ref 32.3–36.5)
MCV RBC AUTO: 93.9 FL (ref 81.4–97.8)
MONOCYTES # BLD AUTO: 0.53 K/UL (ref 0–0.85)
MONOCYTES NFR BLD AUTO: 9.4 % (ref 0–13.4)
NEUTROPHILS # BLD AUTO: 2.26 K/UL (ref 1.82–7.42)
NEUTROPHILS NFR BLD: 40.1 % (ref 44–72)
NRBC # BLD AUTO: 0 K/UL
NRBC BLD-RTO: 0 /100 WBC (ref 0–0.2)
PLATELET # BLD AUTO: 189 K/UL (ref 164–446)
PMV BLD AUTO: 10.7 FL (ref 9–12.9)
POTASSIUM SERPL-SCNC: 4.2 MMOL/L (ref 3.6–5.5)
PROT SERPL-MCNC: 6.9 G/DL (ref 6–8.2)
RBC # BLD AUTO: 5.26 M/UL (ref 4.7–6.1)
SODIUM SERPL-SCNC: 139 MMOL/L (ref 135–145)
T3FREE SERPL-MCNC: 2.96 PG/ML (ref 2–4.4)
T4 FREE SERPL-MCNC: 1.35 NG/DL (ref 0.93–1.7)
THYROPEROXIDASE AB SERPL-ACNC: <9 IU/ML (ref 0–9)
TRIGL SERPL-MCNC: 125 MG/DL (ref 0–149)
TSH SERPL DL<=0.005 MIU/L-ACNC: 2.29 UIU/ML (ref 0.38–5.33)
VIT B12 SERPL-MCNC: 382 PG/ML (ref 211–911)
WBC # BLD AUTO: 5.6 K/UL (ref 4.8–10.8)

## 2023-12-22 PROCEDURE — 86800 THYROGLOBULIN ANTIBODY: CPT

## 2023-12-22 PROCEDURE — 84439 ASSAY OF FREE THYROXINE: CPT

## 2023-12-22 PROCEDURE — 80053 COMPREHEN METABOLIC PANEL: CPT

## 2023-12-22 PROCEDURE — 85025 COMPLETE CBC W/AUTO DIFF WBC: CPT

## 2023-12-22 PROCEDURE — 36415 COLL VENOUS BLD VENIPUNCTURE: CPT

## 2023-12-22 PROCEDURE — 80061 LIPID PANEL: CPT

## 2023-12-22 PROCEDURE — 82607 VITAMIN B-12: CPT

## 2023-12-22 PROCEDURE — 84443 ASSAY THYROID STIM HORMONE: CPT

## 2023-12-22 PROCEDURE — 82306 VITAMIN D 25 HYDROXY: CPT

## 2023-12-22 PROCEDURE — 82746 ASSAY OF FOLIC ACID SERUM: CPT

## 2023-12-22 PROCEDURE — 86376 MICROSOMAL ANTIBODY EACH: CPT

## 2023-12-22 PROCEDURE — 84481 FREE ASSAY (FT-3): CPT

## 2023-12-24 LAB — THYROGLOB AB SERPL-ACNC: <0.9 IU/ML (ref 0–4)

## 2024-06-16 ENCOUNTER — HOSPITAL ENCOUNTER (OUTPATIENT)
Dept: RADIOLOGY | Facility: MEDICAL CENTER | Age: 89
End: 2024-06-16
Attending: NURSE PRACTITIONER
Payer: MEDICARE

## 2024-06-16 DIAGNOSIS — R60.0 LOCALIZED EDEMA: ICD-10-CM

## 2024-06-16 PROCEDURE — 93971 EXTREMITY STUDY: CPT | Mod: LT

## (undated) DEVICE — DRAPE COLUMN  BOX OF 20

## (undated) DEVICE — Device

## (undated) DEVICE — TROCAR Z THREAD12MM OPTICAL - NON BLADED (6/BX)

## (undated) DEVICE — SUTURE 2-0 STRATAFIX SPIRAL PDS SH (12EA/BX)

## (undated) DEVICE — DRAPE ARM  BOX OF 20

## (undated) DEVICE — OBTURATOR BLADELESS STANDARD 8MM (6EA/BX)

## (undated) DEVICE — PAD OR TABLE DA VINCI 2IN X 20IN X 72IN - (12EA/CA)

## (undated) DEVICE — SCISSORS 5MM CVD (6EA/BX)

## (undated) DEVICE — SUTURE 4-0 VICRYL PLUSFS-1 - 27 INCH (36/BX)

## (undated) DEVICE — SUTURE 0 PDS CT-1 CR 8 X 18 (12PK/BX)"

## (undated) DEVICE — TUBING FILTER STRYKER

## (undated) DEVICE — ARMREST CRADLE FOAM - (2PR/PK 12PR/CA)

## (undated) DEVICE — ROBOTIC SURGERY SERVICES

## (undated) DEVICE — PROTECTOR ULNA NERVE - (36PR/CA)

## (undated) DEVICE — CLIP HEMOLOCK PURPLE - (14/BX)

## (undated) DEVICE — TROCAR 5X100 NON BLADED Z-TH - READ KII (6/BX)

## (undated) DEVICE — CLIP HEM-O-LOC GREEN - (14EA/BX)

## (undated) DEVICE — SUTURE 2-0 VICRYL PLUS CT-1 36 (36PK/BX)"

## (undated) DEVICE — SUTURE 0 VICRYL PLUS CT-1 - 36 INCH (36/BX)

## (undated) DEVICE — SUTURE 2-0 20CM STRATAFIX SPIRAL SH NEEDLE (12/BX)

## (undated) DEVICE — SET LEADWIRE 5 LEAD BEDSIDE DISPOSABLE ECG (1SET OF 5/EA)

## (undated) DEVICE — SEAL 5MM-8MM UNIVERSAL  BOX OF 10

## (undated) DEVICE — CATHETER URETHRAL FOLEY SILICONE 22 FR 30 ML 3-WAY

## (undated) DEVICE — GLOVE BIOGEL INDICATOR SZ 8 SURGICAL PF LTX - (50/BX 4BX/CA)

## (undated) DEVICE — SUTURE 4-0 MONOCRYL PLUS PS-1 - 27 INCH (36/BX)

## (undated) DEVICE — BAG RETRIEVAL 10ML (10EA/BX)

## (undated) DEVICE — TUBE CONNECTING SUCTION - CLEAR PLASTIC STERILE 72 IN (50EA/CA)

## (undated) DEVICE — TOWEL STOP TIMEOUT SAFETY FLAG (40EA/CA)

## (undated) DEVICE — CATHETER URETHRAL FOLEY SILICONE OD16 FR 10 ML (10EA/CA)

## (undated) DEVICE — SUTURE 3-0 15CM STRATAFIX SPIRAL RB-1 (12EA/BX)

## (undated) DEVICE — TRAY SRGPRP PVP IOD WT PRP - (20/CA)

## (undated) DEVICE — BLADE SURGICAL #15 - (50/BX 3BX/CA)

## (undated) DEVICE — TOWELS CLOTH SURGICAL - (4/PK 20PK/CA)

## (undated) DEVICE — GLOVE BIOGEL SZ 7.5 SURGICAL PF LTX - (50PR/BX 4BX/CA)

## (undated) DEVICE — GOWN WARMING STANDARD FLEX - (30/CA)

## (undated) DEVICE — SUTURE 4-0 30CM STRATAFIX SPIRAL PS-2 (12EA/BX)

## (undated) DEVICE — PACK TRENGUARD 450 PROCEDURE (12EA/CA)

## (undated) DEVICE — PACK DAVINCI PROSTATECTOMY - (1EA/CA)

## (undated) DEVICE — SUTURE 3-0 VICRYL PLUS RB-1 - (36/BX)

## (undated) DEVICE — SLEEVE VASO CALF MED - (10PR/CA)

## (undated) DEVICE — COVER TIP ENDOWRIST HOT SHEAR - (10EA/BX) DA VINCI

## (undated) DEVICE — CATHETER URETHRAL FOLEY SILICONE 20 FR 30 ML 3-WAY

## (undated) DEVICE — SODIUM CHL IRRIGATION 0.9% 1000ML (12EA/CA)

## (undated) DEVICE — SUTURE GENERAL